# Patient Record
Sex: MALE | Race: WHITE | NOT HISPANIC OR LATINO
[De-identification: names, ages, dates, MRNs, and addresses within clinical notes are randomized per-mention and may not be internally consistent; named-entity substitution may affect disease eponyms.]

---

## 2017-04-09 ENCOUNTER — FORM ENCOUNTER (OUTPATIENT)
Age: 72
End: 2017-04-09

## 2021-09-24 PROBLEM — Z00.00 ENCOUNTER FOR PREVENTIVE HEALTH EXAMINATION: Status: ACTIVE | Noted: 2021-09-24

## 2021-09-27 ENCOUNTER — APPOINTMENT (OUTPATIENT)
Dept: GASTROENTEROLOGY | Facility: CLINIC | Age: 76
End: 2021-09-27
Payer: MEDICARE

## 2021-09-27 VITALS
BODY MASS INDEX: 32.18 KG/M2 | WEIGHT: 205 LBS | DIASTOLIC BLOOD PRESSURE: 80 MMHG | TEMPERATURE: 96.9 F | SYSTOLIC BLOOD PRESSURE: 142 MMHG | HEIGHT: 67 IN

## 2021-09-27 DIAGNOSIS — K57.90 DIVERTICULOSIS OF INTESTINE, PART UNSPECIFIED, W/OUT PERFORATION OR ABSCESS W/OUT BLEEDING: ICD-10-CM

## 2021-09-27 DIAGNOSIS — Z12.11 ENCOUNTER FOR SCREENING FOR MALIGNANT NEOPLASM OF COLON: ICD-10-CM

## 2021-09-27 DIAGNOSIS — K63.5 POLYP OF COLON: ICD-10-CM

## 2021-09-27 DIAGNOSIS — D37.4 NEOPLASM OF UNCERTAIN BEHAVIOR OF COLON: ICD-10-CM

## 2021-09-27 DIAGNOSIS — Z78.9 OTHER SPECIFIED HEALTH STATUS: ICD-10-CM

## 2021-09-27 PROCEDURE — 99204 OFFICE O/P NEW MOD 45 MIN: CPT

## 2021-09-27 PROCEDURE — 99214 OFFICE O/P EST MOD 30 MIN: CPT

## 2021-09-27 RX ORDER — METFORMIN HYDROCHLORIDE 1000 MG/1
1000 TABLET, COATED ORAL
Refills: 0 | Status: ACTIVE | COMMUNITY

## 2021-09-27 RX ORDER — SODIUM PICOSULFATE, MAGNESIUM OXIDE, AND ANHYDROUS CITRIC ACID 10; 3.5; 12 MG/160ML; G/160ML; G/160ML
10-3.5-12 MG-GM LIQUID ORAL TWICE DAILY
Qty: 2 | Refills: 0 | Status: ACTIVE | COMMUNITY
Start: 2021-09-27 | End: 1900-01-01

## 2021-09-27 NOTE — CONSULT LETTER
[Dear  ___] : Dear  [unfilled], [Consult Letter:] : I had the pleasure of evaluating your patient, [unfilled]. [( Thank you for referring [unfilled] for consultation for _____ )] : Thank you for referring [unfilled] for consultation for [unfilled] [Please see my note below.] : Please see my note below. [Consult Closing:] : Thank you very much for allowing me to participate in the care of this patient.  If you have any questions, please do not hesitate to contact me. [Sincerely,] : Sincerely, [FreeTextEntry3] : Don\par \par Sinan Perkins MD\par

## 2021-09-27 NOTE — HISTORY OF PRESENT ILLNESS
[FreeTextEntry1] : He is a 76-year-old asymptomatic male referred for screening colonoscopy.  He has a past history of colon polyps.  His last colonoscopy was 9/10/2018 in which 3 polyps were removed and diverticular disease was seen. 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  He denies a family history of colon cancer.

## 2021-09-27 NOTE — ASSESSMENT
[FreeTextEntry1] : MARGARETTE LEIGH was advised to undergo colonoscopy to which he agreed. The procedure will be performed in [facility] \par [facility] with the assistance of an anesthesiologist. The patient was given a Suprep preparation prescription and understood the \par procedure as it was explained to his. He was given a booklet distributed by the [employer]\par  Endoscopy explaining the procedure in detail and he understood the risks of the procedure not limited to infection, bleeding,\par perforation or non- diagnosis of colorectal cancer. He was advised that he could not drive home, if he chooses to\par  receive sedation.\par \par Further diagnostic and treatment recommendations will be based upon the procedure and any biopsies, if they are taken.\par \par Thank you for allowing me to participate in this [facility] health care.\par \par , Best personal regards -- Don\par

## 2021-09-27 NOTE — PHYSICAL EXAM
[Sclera] : the sclera and conjunctiva were normal [PERRL With Normal Accommodation] : pupils were equal in size, round, and reactive to light [Extraocular Movements] : extraocular movements were intact [Outer Ear] : the ears and nose were normal in appearance [Oropharynx] : the oropharynx was normal [Auscultation Breath Sounds / Voice Sounds] : lungs were clear to auscultation bilaterally [Heart Rate And Rhythm] : heart rate was normal and rhythm regular [Heart Sounds] : normal S1 and S2 [Heart Sounds Gallop] : no gallops [Murmurs] : no murmurs [Heart Sounds Pericardial Friction Rub] : no pericardial rub [Bowel Sounds] : normal bowel sounds [Abdomen Soft] : soft [Abdomen Tenderness] : non-tender [Abdomen Mass (___ Cm)] : no abdominal mass palpated [No CVA Tenderness] : no ~M costovertebral angle tenderness [No Spinal Tenderness] : no spinal tenderness [Abnormal Walk] : normal gait [Nail Clubbing] : no clubbing  or cyanosis of the fingernails [Musculoskeletal - Swelling] : no joint swelling seen [Motor Tone] : muscle strength and tone were normal [Skin Color & Pigmentation] : normal skin color and pigmentation [Skin Turgor] : normal skin turgor [] : no rash [Oriented To Time, Place, And Person] : oriented to person, place, and time [Impaired Insight] : insight and judgment were intact [Affect] : the affect was normal

## 2021-11-29 ENCOUNTER — APPOINTMENT (OUTPATIENT)
Dept: GASTROENTEROLOGY | Facility: AMBULATORY SURGERY CENTER | Age: 76
End: 2021-11-29
Payer: MEDICARE

## 2021-11-29 PROCEDURE — G0105: CPT

## 2022-07-27 ENCOUNTER — EMERGENCY (EMERGENCY)
Facility: HOSPITAL | Age: 77
LOS: 1 days | Discharge: HOME | End: 2022-07-27
Attending: EMERGENCY MEDICINE
Payer: MEDICARE

## 2022-07-27 VITALS
HEIGHT: 66 IN | OXYGEN SATURATION: 96 % | HEART RATE: 73 BPM | WEIGHT: 197.09 LBS | SYSTOLIC BLOOD PRESSURE: 239 MMHG | TEMPERATURE: 98 F | DIASTOLIC BLOOD PRESSURE: 99 MMHG | RESPIRATION RATE: 20 BRPM

## 2022-07-27 PROCEDURE — 99152 MOD SED SAME PHYS/QHP 5/>YRS: CPT

## 2022-07-27 PROCEDURE — 99283 EMERGENCY DEPT VISIT LOW MDM: CPT

## 2022-07-27 PROCEDURE — 99284 EMERGENCY DEPT VISIT MOD MDM: CPT | Mod: 57

## 2022-07-27 PROCEDURE — 23650 CLTX SHO DSLC W/MNPJ WO ANES: CPT | Mod: 54

## 2022-07-27 NOTE — ED ADULT NURSE NOTE - CAS DISCH CONDITION
Improved Closure 3 Information: This tab is for additional flaps and grafts above and beyond our usual structured repairs.  Please note if you enter information here it will not currently bill and you will need to add the billing information manually.

## 2022-07-27 NOTE — ED ADULT NURSE NOTE - OBJECTIVE STATEMENT
78yo M pt PMHx of DM p/w fall mechanical over a basket that was on the floor. pt endorses landing on his R side with R shoulder pain. pt has tried nothing for relief.   pt is A&Ox4, MAEW, RUE ROM limited  d/t pain, PMS intact.  Pt denies headache, dizziness, chest pain, palpitations, cough, SOB, abdominal pain, n/v/d, urinary symptoms, fevers, chills, weakness at this time.

## 2022-07-28 VITALS
SYSTOLIC BLOOD PRESSURE: 197 MMHG | HEART RATE: 80 BPM | RESPIRATION RATE: 20 BRPM | DIASTOLIC BLOOD PRESSURE: 79 MMHG | OXYGEN SATURATION: 95 %

## 2022-07-28 PROCEDURE — 71045 X-RAY EXAM CHEST 1 VIEW: CPT

## 2022-07-28 PROCEDURE — 73030 X-RAY EXAM OF SHOULDER: CPT | Mod: 26,RT

## 2022-07-28 PROCEDURE — 71045 X-RAY EXAM CHEST 1 VIEW: CPT | Mod: 26

## 2022-07-28 PROCEDURE — 99153 MOD SED SAME PHYS/QHP EA: CPT

## 2022-07-28 PROCEDURE — 73070 X-RAY EXAM OF ELBOW: CPT | Mod: 26,RT

## 2022-07-28 PROCEDURE — 73060 X-RAY EXAM OF HUMERUS: CPT | Mod: 26,RT

## 2022-07-28 PROCEDURE — 99152 MOD SED SAME PHYS/QHP 5/>YRS: CPT

## 2022-07-28 PROCEDURE — 73030 X-RAY EXAM OF SHOULDER: CPT

## 2022-07-28 PROCEDURE — 73060 X-RAY EXAM OF HUMERUS: CPT

## 2022-07-28 PROCEDURE — 96375 TX/PRO/DX INJ NEW DRUG ADDON: CPT | Mod: XU

## 2022-07-28 PROCEDURE — 96374 THER/PROPH/DIAG INJ IV PUSH: CPT | Mod: XU

## 2022-07-28 PROCEDURE — 99283 EMERGENCY DEPT VISIT LOW MDM: CPT

## 2022-07-28 PROCEDURE — 23650 CLTX SHO DSLC W/MNPJ WO ANES: CPT | Mod: RT

## 2022-07-28 PROCEDURE — 73070 X-RAY EXAM OF ELBOW: CPT

## 2022-07-28 PROCEDURE — 99285 EMERGENCY DEPT VISIT HI MDM: CPT | Mod: 25

## 2022-07-28 RX ORDER — OXYCODONE HYDROCHLORIDE 5 MG/1
5 TABLET ORAL ONCE
Refills: 0 | Status: DISCONTINUED | OUTPATIENT
Start: 2022-07-28 | End: 2022-07-28

## 2022-07-28 RX ORDER — MORPHINE SULFATE 50 MG/1
6 CAPSULE, EXTENDED RELEASE ORAL ONCE
Refills: 0 | Status: DISCONTINUED | OUTPATIENT
Start: 2022-07-28 | End: 2022-07-28

## 2022-07-28 RX ORDER — ACETAMINOPHEN 500 MG
1000 TABLET ORAL ONCE
Refills: 0 | Status: COMPLETED | OUTPATIENT
Start: 2022-07-28 | End: 2022-07-28

## 2022-07-28 RX ORDER — LIDOCAINE HCL 20 MG/ML
15 VIAL (ML) INJECTION ONCE
Refills: 0 | Status: COMPLETED | OUTPATIENT
Start: 2022-07-28 | End: 2022-07-28

## 2022-07-28 RX ORDER — PROPOFOL 10 MG/ML
50 INJECTION, EMULSION INTRAVENOUS ONCE
Refills: 0 | Status: COMPLETED | OUTPATIENT
Start: 2022-07-28 | End: 2022-07-28

## 2022-07-28 RX ORDER — PROPOFOL 10 MG/ML
135 INJECTION, EMULSION INTRAVENOUS ONCE
Refills: 0 | Status: COMPLETED | OUTPATIENT
Start: 2022-07-28 | End: 2022-07-28

## 2022-07-28 RX ORDER — DIAZEPAM 5 MG
5 TABLET ORAL ONCE
Refills: 0 | Status: DISCONTINUED | OUTPATIENT
Start: 2022-07-28 | End: 2022-07-28

## 2022-07-28 RX ADMIN — MORPHINE SULFATE 6 MILLIGRAM(S): 50 CAPSULE, EXTENDED RELEASE ORAL at 01:32

## 2022-07-28 RX ADMIN — OXYCODONE HYDROCHLORIDE 5 MILLIGRAM(S): 5 TABLET ORAL at 00:07

## 2022-07-28 RX ADMIN — Medication 400 MILLIGRAM(S): at 03:56

## 2022-07-28 RX ADMIN — Medication 1000 MILLIGRAM(S): at 05:52

## 2022-07-28 RX ADMIN — Medication 15 MILLILITER(S): at 01:54

## 2022-07-28 RX ADMIN — PROPOFOL 135 MILLIGRAM(S): 10 INJECTION, EMULSION INTRAVENOUS at 03:12

## 2022-07-28 RX ADMIN — PROPOFOL 50 MILLIGRAM(S): 10 INJECTION, EMULSION INTRAVENOUS at 03:25

## 2022-07-28 RX ADMIN — Medication 5 MILLIGRAM(S): at 00:39

## 2022-07-28 NOTE — ED PROCEDURE NOTE - NS_POSTPROCCAREGUIDE_ED_ALL_ED
Patient is now fully awake, with vital signs and temperature stable, hydration is adequate, patients Apollo’s  score is at baseline (or greater than 8), patient and escort has received  discharge education.

## 2022-07-28 NOTE — CONSULT NOTE ADULT - SUBJECTIVE AND OBJECTIVE BOX
77yMale  RHD presents with right shoulder pain s/p mechanical fall. Patient denies headstrike or LOC. Patient noted immediate pain after the injury and inability to range the shoulder. The patient subsequently came to the ED for further evaluation and management. In the ED, the patient endorses pain of the shoulder and inability to range the shoulder. Patient denies numbness, tingling, weakness, or any other orthopaedic complaint.     Gen: Resting in bed, NAD  RUE:  Skin intact, + sulcus sign,   SILT radial/median/ulnar/axillary  +AIN/PIN/Ulnar/Radial/Musc/Median,   +elbow/wrist ROM,   shoulder ROM limited 2/2 pain,   +radial pulse, soft compartments.    Secondary Assessment:  NC/AT, NTTP of clavicles, NTTP of C-spine,T-spine, or L-spine in the midline and paraspinal areas; NTTP of pelvis  LUE: NTTP of Shoulder, Elbow, Wrist, Hand; NT with AROM/PROM of Shoulder, Elbow, Wrist, Hand; AIN/PIN/Med/Uln/Msc/Rad/Ax intact  LLE: Able to SLR, NT with Log Roll, NT with Heel Strike, NTTP of Hip, Knee, Ankle, Foot; NT with AROM/PROM of Hip, Knee, Ankle, Foot; Q/H/GSC/TA/EHL/FHL intact  RLE: Able to SLR, NT with Log Roll, NT with Heel Strike, NTTP of Hip, Knee, Ankle, Foot; NT with AROM/PROM of Hip, Knee, Ankle, Foot; Q/H/GSC/TA/EHL/FHL intact    Imaging: Xray imaging of the R shoulder reviewed demonstrating shoulder dislocation.     Procedure Note:  Patient underwent conscious sedation per ED. Closed reduction was then performed and the affected extremity was immobilized. The patient tolerated the procedure well and there were no complications. The patient was neurovascularly intact following reduction. Post-reduction xrays demonstrated a reduced shoulder.    Assessment:  77yMale with R shoulder dislocation    Plan:   Imaging findings reviewed and discussed with the patient.   Shoulder reduced per procedure note above.   NWB R UE in shoulder immobilizer  Pain control PRN  No acute orthopaedic surgical intervention indicated at this time. This patient is orthopaedically stable for discharge.   Patient to follow up with Dr. Diez as an outpatient for further evaluation and management.   All of the patient's questions and concerns were answered and addressed.  Will discuss with Dr. Diez and advise of any changes to the plan.

## 2022-07-28 NOTE — ED PROVIDER NOTE - CARE PROVIDER_API CALL
Sean Diez (MD)  Orthopaedic Surgery  [address]  [place] [address]  Phone: [phone]  Fax: [phone]  Follow Up Time:

## 2022-07-28 NOTE — ED PROVIDER NOTE - PATIENT PORTAL LINK FT
You can access the FollowMyHealth Patient Portal offered by [facility] by registering at the following website: [email or web address]. By joining [employer]’s FollowMyHealth portal, you will also be able to view your health information using other applications (apps) compatible with our system.

## 2022-07-28 NOTE — ED PROVIDER NOTE - NSFOLLOWUPINSTRUCTIONS_ED_ALL_ED_FT
Please follow with Dr. Sean Diez within the next 7 days.     To control your pain at home, you should take Ibuprofen 400 mg along with Tylenol 650mg-1000mg every 6 to 8 hours. Limit your maximum daily Tylenol from all sources to 4000mg.     Lab and imaging results, if performed, were discussed with you along with your discharge diagnosis    Follow up with your doctor in 1 week - bring copies of your results if you were given. If you do not have a primary doctor, please call [phone] to find one convenient for you    Return to ED for any new or worsening symptoms including but not limited to: development of worsening arm pain, dislocation, changes in temperature of arm, chest pain, shortness of breath, fever, vomiting, focal numbness, weakness or tingling, any severe CP, headache, abdominal pain, back pain.      Continue all prescribed medications    Rest and keep yourself hydrated with fluids

## 2022-07-28 NOTE — ED PROVIDER NOTE - WR ORDER DATE AND TIME 2
New home O@ requirement since D/C on 10/1/18  Patient reports that she only uses when she feels like she needs it - while climbing stairs    Continual SPO2 monitoring  Respiratory protocol  Continue diureisis 28-Jul-2022 03:49

## 2022-07-28 NOTE — ED ADULT NURSE REASSESSMENT NOTE - NS ED NURSE REASSESS COMMENT FT1
conscious sedation done for shoulder reduction please see flow sheet for details.. . all equipment in room including capnography, ambu bag, pt on cardiac monitor. pt tolerated procedure well, now fully awake. extra propofol wasted by 2 ED RNs

## 2022-07-28 NOTE — ED PROCEDURE NOTE - PROCEDURE ADDITIONAL DETAILS
In ED, multiple techniques performed for R shoulder dislocation. Given oxycodone, valium, morphine 6mg and intraarticular block with lidocaine. These attempts were not successful. Ortho consulted. Under conscious sedation, reduction reattempted. Patient was placed supine. Supplemental 02 given, placed on monitor. Vitals continuously monitored during procedure. Total of 185mg Propofol given. Shoulder reduced via Kocher. Post-procedure XR showed successful reduction.

## 2022-07-28 NOTE — ED PROVIDER NOTE - PHYSICAL EXAMINATION
General: Alert and Orientated x 3. In mod pain   Head: Normocephalic and atraumatic.  Eyes: PERRLA with EOMI.  Neck: Supple. Trachea midline.   Cardiac: Normal S1 and S2 w/ RRR. No murmurs appreciated.   Pulmonary: Vesicular breath sounds bilaterally. No increased WOB. No wheezes or crackles.  Abdominal: Soft, non-tender. (+) bowel sounds appreciated in all 4 quadrants. No hepatosplenomegaly.   Neurologic: No focal sensory or motor deficits.  Musculoskeletal: RUE: Obvious bony deformity, asymmetry when compared to L shoulder. Sensationm intact in axillary distribution. Arm in adduction and internal rotation. Limited rom. strength 3/5 due to pain. Pulse intact.   Skin: Color appropriate for race. Intact, warm, and well-perfused.  Lymphatic: No cervical or inguinal adenopathy appreciated.  Psychiatric: Appropriate mood and affect. No apparent risk to self or others.

## 2022-07-28 NOTE — ED PROVIDER NOTE - CLINICAL SUMMARY MEDICAL DECISION MAKING FREE TEXT BOX
78 y/o m p/w R shoulder pain s/p mechanical fall on outstretched arm. Hypertensive. Exam w/ shoulder asymmetry. Neurovascularly intact. Likely shoulder dislocation. Will get xrs. Will reduce. Dispo- pending imaging an reduction.

## 2022-07-28 NOTE — ED PROVIDER NOTE - OBJECTIVE STATEMENT
78 y/o M R-handed w/ PMHx sig for DM and hypothyroidism who presents to ED with R shoulder pain. Around 10pm, patient was walking and trippe avinash baset. Fell on outstretched arm. Never injured arm. No numbness or tingling. No head injury. Has not taken anything for pain.

## 2022-07-28 NOTE — ED PROVIDER NOTE - PROGRESS NOTE DETAILS
Jenelle Curry MD (PGY2): Ortho consulted for unsuccessful reduction attempts. Under procedural sedation, shoulder reduced. Consent obtained. Procedural sedation w/ propofol started at 3:12am. Vitals continuously monitored. Was placed on supplemental O2. Post-reduction films showing success. Patient observed for 30 minutes. Vitals stable. Post- reduction neurovascularly intact. ortho f/u outpatient. strict return precautions.

## 2022-07-28 NOTE — ED PROCEDURE NOTE - ATTENDING CONTRIBUTION TO CARE
***Seth Fatima MD FACEP*** Attending physician was available for the key components of the procedure, the patient tolerated well. There were no complications with the procedure.

## 2022-07-28 NOTE — ED PROVIDER NOTE - ATTENDING CONTRIBUTION TO CARE
Patient with fall prior to arrival after tripping on a basket. patient did not hit his head, did not lose consciousness but fell on an outstretched hand and had resultant right shoulder pain with inability to range. No history of prior shoulder injury.  no shortness of breath.   right shoulder with sulcus to glenohumeral fossae and humeral head palpated anterior to the fossae  trachea midline  mmm  Mallampati 2  bilateral breath sounds   no deformity of lower extremities  no rash/vesicles/petechiae   soft, ntnd   pleasant, cooperative, with capacity and insight   Seth Fatima MD, FACEP: In this physician's medical judgement based on clinical history and physical exam: patient with right shoulder anterior dislocation vs humeral neck fracture will obtain shoulder xray, analgesia, and attempt reduction with appropriate analgesia.  patient unable to be reduced with Keita, Milagros, Farodilon, and Milstacy techniques patient had valium, oxycodone and morphine administered  will consult orthopedics for further attempts   last meal 1830 Patient with fall prior to arrival after tripping on a basket. patient did not hit his head, did not lose consciousness but fell on an outstretched hand and had resultant right shoulder pain with inability to range. No history of prior shoulder injury.  no shortness of breath.   right shoulder with sulcus to glenohumeral fossae and humeral head palpated anterior to the fossae  trachea midline  mmm  Mallampati 2  bilateral breath sounds   no deformity of lower extremities  no rash/vesicles/petechiae   soft, ntnd   pleasant, cooperative, with capacity and insight   Seth Fatima MD, FACEP: In this physician's medical judgement based on clinical history and physical exam: patient with right shoulder anterior dislocation vs humeral neck fracture will obtain shoulder xray, analgesia, and attempt reduction with appropriate analgesia.  patient unable to be reduced with Keita, Milagros, Jorge, and Herson techniques patient had valium, oxycodone and morphine administered  will consult orthopedics for further attempts   last meal 1830  patient then reduced with orthopedics at bedside with conscious sedation, patient neurovascularly intact and reduced and blood pressure noted to be elevated but likely secondary to pain.   will discharge patient home with orthopedic follow up   The patient was serially evaluated throughout emergency department course by the team. There was no acute deterioration up to this time in the emergency department. The patient has demonstrated clinical improvement and/or stability, feels better at this time according to emergency department team. Agree with goals/plan of emergency department care as described in this physician's electronic medical record, including diagnostics, therapeutics and consultation recommendation as clinically warranted. Will discharge home with close outpatient follow up with primary care physician/provider and specialist if necessary. The patient and/or family was educated on expectant management and return precautions concerning signs and features to return to the emergency department, in layman terms, including but not limited to: nausea, vomiting, fever, chills, the inability to eat, take medications, or drink, persistent/worsening symptoms or any concerns at all. There are no acute or immediate life threatening issues present on history, clinical exam, or any diagnostic evaluation. The patient is a safe disposition home, has capacity and insight into their condition, is ambulatory in the Emergency Department with no further questions and will follow up with their doctor(s) this week. Diagnosis, prognosis, natural history and treatment was discussed with patient and/or family. The patient and/or family were given the opportunity to ask questions and have them answered in full. The patient and/or family are with capacity and insight into the situation, treatment, risks, benefits, alternative therapies, and understand that they can ask any further questions if needed. Patient and/or family/guardian understands anticipatory guidance and was given strict return and follow up precautions. The patient and/or family/guardian has been informed of the necessity to follow up with the PMD/Clinic/follow up as provided within 2-3 days, and the patient and/or family/guardian reports understanding of above with capacity and insight. The patient and/or family/guardian were informed of any results of their tests and are were encouraged to follow up on the findings with their doctor as well as the need to inform their doctor of any results. The patient and/or family/guardian are aware of the need to follow up with repeat testing as applicable and report understanding of the above with capacity and insight. The patient and/or family/guardian was made aware of any pending test results at the time of discharge and of the need to call back for the final results as well as the need to inform their doctor of the results.

## 2022-07-28 NOTE — ED PROCEDURE NOTE - CPROC ED REDUCTION TECHNIQUE1
Multiple: akira Keita FARES, Kocher/scapular manipulation Multiple: Milagros Keita FARES, Kocher/scapular manipulation

## 2022-08-01 PROBLEM — E03.9 HYPOTHYROIDISM, UNSPECIFIED: Chronic | Status: ACTIVE | Noted: 2022-07-28

## 2022-08-01 PROBLEM — E11.9 TYPE 2 DIABETES MELLITUS WITHOUT COMPLICATIONS: Chronic | Status: ACTIVE | Noted: 2022-07-28

## 2022-08-08 ENCOUNTER — APPOINTMENT (OUTPATIENT)
Dept: ORTHOPEDIC SURGERY | Facility: CLINIC | Age: 77
End: 2022-08-08

## 2022-08-08 VITALS
HEIGHT: 67 IN | WEIGHT: 195 LBS | DIASTOLIC BLOOD PRESSURE: 82 MMHG | SYSTOLIC BLOOD PRESSURE: 190 MMHG | HEART RATE: 72 BPM | BODY MASS INDEX: 30.61 KG/M2

## 2022-08-08 PROCEDURE — 73030 X-RAY EXAM OF SHOULDER: CPT | Mod: RT

## 2022-08-08 PROCEDURE — 99204 OFFICE O/P NEW MOD 45 MIN: CPT

## 2022-08-08 NOTE — HISTORY OF PRESENT ILLNESS
[de-identified] : 77 year old RHD male presents for initial evaluation of right shoulder pain. He states about two weeks ago he tripped and fell in his room landing on his shoulder. He heard a pop, immediately experienced sharp pain. He went to [facility] ER same day, where he had x-rays taken which reportedly showed anterior dislocation. The shoulder was reduced and x-rays were taken post reduction to confirm. He has intermittent achy pain, slightly worse with movements. He has been taking Tylenol with good relief. He denies any prior injury or trauma. He denies numbness or tingling.

## 2022-08-08 NOTE — PHYSICAL EXAM
[Rad] : radial 2+ and symmetric bilaterally [Normal] : Alert and in no acute distress [Poor Appearance] : well-appearing [Acute Distress] : not in acute distress [Obese] : not obese [de-identified] : The patient has no respiratory distress. Mood and affect are normal. The patient is alert and oriented to person, place and time.\par Examination of the cervical spine demonstrates no tenderness, no deformity and no muscle spasm. Cervical spine rotation is 60° to the right, 60° to the left, 75° of extension and 45° of flexion. Neurologic exam of the upper extremities reveals intact sensation to light touch. Motor function is 5 over 5 in all groups. Deep tendon reflexes are 2+ and equal at the biceps, triceps and brachioradialis.\par Examination of the right shoulder demonstrates that it is clinically located.  There is mild anterolateral tenderness.  There is a small area of ecchymosis on the right arm.  Drop arm test is negative.  He has weakness with external rotation against resistance.  He has a Enmanuel deformity.  The elbows are stable.  There is no pain with wrist range of motion.  The skin is otherwise intact.  There is no lymphedema. [de-identified] : AP, transscapular and axillary x-rays of the right shoulder taken today demonstrate no fracture or dislocation.  Patient has Hill-Sachs lesion of the proximal humerus\par X-rays from the emergency room at [facility] are reviewed.  This includes x-rays of the right shoulder, x-ray of the right humerus, and postreduction x-rays of the right shoulder.  These demonstrate anterior dislocation with Hill-Sachs lesion with concentric reduction.

## 2022-08-08 NOTE — DISCUSSION/SUMMARY
[de-identified] : The patient had a dislocation of his right shoulder.  He likely has a rotator cuff tear.  I have discussed the pathology and natural history with him.  I recommend a course of physical therapy.  A referral was given.  He will be reevaluated in 1 month.  He should avoid external rotation beyond 40 degrees.

## 2022-09-12 ENCOUNTER — APPOINTMENT (OUTPATIENT)
Dept: ORTHOPEDIC SURGERY | Facility: CLINIC | Age: 77
End: 2022-09-12

## 2022-09-12 VITALS
WEIGHT: 195 LBS | SYSTOLIC BLOOD PRESSURE: 215 MMHG | HEIGHT: 67 IN | BODY MASS INDEX: 30.61 KG/M2 | HEART RATE: 66 BPM | DIASTOLIC BLOOD PRESSURE: 74 MMHG

## 2022-09-12 PROCEDURE — 99213 OFFICE O/P EST LOW 20 MIN: CPT

## 2022-09-12 RX ORDER — MELOXICAM 7.5 MG/1
7.5 TABLET ORAL DAILY
Qty: 1 | Refills: 0 | Status: ACTIVE | COMMUNITY
Start: 2022-09-12 | End: 1900-01-01

## 2022-09-12 NOTE — PHYSICAL EXAM
[Rad] : radial 2+ and symmetric bilaterally [Normal] : Alert and in no acute distress [Poor Appearance] : well-appearing [Acute Distress] : not in acute distress [Obese] : not obese [de-identified] : The patient has no respiratory distress. Mood and affect are normal. The patient is alert and oriented to person, place and time.\par Examination of the cervical spine demonstrates no tenderness, no deformity and no muscle spasm. Cervical spine rotation is 60° to the right, 60° to the left, 75° of extension and 45° of flexion. Neurologic exam of the upper extremities reveals intact sensation to light touch. Motor function is 5 over 5 in all groups. Deep tendon reflexes are 2+ and equal at the biceps, triceps and brachioradialis.\par Examination of the right shoulder demonstrates that it is clinically located.  There is mild anterolateral tenderness.  There is a small area of ecchymosis on the right arm.  Drop arm test is negative.  He has weakness with external rotation against resistance.  He has a Enmanuel deformity.  The elbows are stable.  There is no pain with wrist range of motion.  The skin is otherwise intact.  There is no lymphedema.

## 2022-09-12 NOTE — DISCUSSION/SUMMARY
[de-identified] : The patient has persistent right shoulder pain following dislocation.  He does have elevation of 150 degrees with pain.  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  He has been taking Tylenol without significant pain relief.  He feels that the physical therapy has not been helping.  I have started him on a course of Mobic.  Medication risks have been reviewed.  He will discontinue physical therapy and work on a home exercise program.  I would like to reevaluate him in 2 weeks.

## 2022-09-12 NOTE — HISTORY OF PRESENT ILLNESS
[de-identified] : 77 year old male presents for reevaluation of right shoulder pain s/p dislocation about 1 month ago. He has been doing PT 2 x per week, last session was last week. He has not been taking any medications for the pain, has been using a topical pain reliever with good relief.

## 2022-10-03 ENCOUNTER — APPOINTMENT (OUTPATIENT)
Dept: ORTHOPEDIC SURGERY | Facility: CLINIC | Age: 77
End: 2022-10-03

## 2022-10-03 VITALS
WEIGHT: 195 LBS | SYSTOLIC BLOOD PRESSURE: 197 MMHG | HEIGHT: 67 IN | HEART RATE: 58 BPM | DIASTOLIC BLOOD PRESSURE: 71 MMHG | BODY MASS INDEX: 30.61 KG/M2

## 2022-10-03 DIAGNOSIS — S49.91XA UNSPECIFIED INJURY OF RIGHT SHOULDER AND UPPER ARM, INITIAL ENCOUNTER: ICD-10-CM

## 2022-10-03 PROCEDURE — 99214 OFFICE O/P EST MOD 30 MIN: CPT

## 2022-10-03 RX ORDER — NABUMETONE 750 MG/1
750 TABLET, FILM COATED ORAL
Qty: 60 | Refills: 0 | Status: ACTIVE | COMMUNITY
Start: 2022-10-03 | End: 1900-01-01

## 2022-10-03 NOTE — PHYSICAL EXAM
[Rad] : radial 2+ and symmetric bilaterally [Normal] : Alert and in no acute distress [Poor Appearance] : well-appearing [Acute Distress] : not in acute distress [Obese] : not obese [de-identified] : The patient has no respiratory distress. Mood and affect are normal. The patient is alert and oriented to person, place and time.\par Examination of the cervical spine demonstrates no tenderness, no deformity and no muscle spasm. Cervical spine rotation is 60° to the right, 60° to the left, 75° of extension and 45° of flexion. Neurologic exam of the upper extremities reveals intact sensation to light touch. Motor function is 5 over 5 in all groups. Deep tendon reflexes are 2+ and equal at the biceps, triceps and brachioradialis.\par Examination of the right shoulder demonstrates that it is clinically located.  There is mild anterolateral tenderness.   Drop arm test is negative.  He has weakness with external rotation against resistance.  He has a Enmanuel deformity.  The elbows are stable.  There is no pain with wrist range of motion.  The skin is otherwise intact.  There is no lymphedema.

## 2022-10-03 NOTE — DISCUSSION/SUMMARY
[Medication Risks Reviewed] : Medication risks reviewed [de-identified] : The patient has continued right shoulder pain.  He did not get relief with physical therapy or Mobic.  I have offered him a steroid injection but I have explained that his blood sugar may go up temporarily.  He has decided not to have the injection.  He is switched to a course of nabumetone.  He will be reevaluated in 4 weeks.

## 2022-10-03 NOTE — HISTORY OF PRESENT ILLNESS
[de-identified] : The patient presents for reevaluation of right shoulder pain s/p dislocation about 1 1/2 months ago. He feels no improvement in symptoms from his last visit. He has been taking Mobic with minimal relief. He tried a course of PT with no relief. 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 He is interested in discussing other options for pain relief.

## 2022-10-31 ENCOUNTER — APPOINTMENT (OUTPATIENT)
Dept: ORTHOPEDIC SURGERY | Facility: CLINIC | Age: 77
End: 2022-10-31

## 2022-11-09 ENCOUNTER — RX RENEWAL (OUTPATIENT)
Age: 77
End: 2022-11-09

## 2022-12-08 DIAGNOSIS — Z80.9 FAMILY HISTORY OF MALIGNANT NEOPLASM, UNSPECIFIED: ICD-10-CM

## 2022-12-08 DIAGNOSIS — Z83.3 FAMILY HISTORY OF DIABETES MELLITUS: ICD-10-CM

## 2022-12-08 DIAGNOSIS — L60.2 ONYCHOGRYPHOSIS: ICD-10-CM

## 2022-12-08 DIAGNOSIS — Q82.8 OTHER SPECIFIED CONGENITAL MALFORMATIONS OF SKIN: ICD-10-CM

## 2022-12-08 DIAGNOSIS — Z87.39 PERSONAL HISTORY OF OTHER DISEASES OF THE MUSCULOSKELETAL SYSTEM AND CONNECTIVE TISSUE: ICD-10-CM

## 2022-12-21 ENCOUNTER — APPOINTMENT (OUTPATIENT)
Dept: PODIATRY | Facility: CLINIC | Age: 77
End: 2022-12-21
Payer: MEDICARE

## 2022-12-21 DIAGNOSIS — M21.612 BUNION OF LEFT FOOT: ICD-10-CM

## 2022-12-21 DIAGNOSIS — I10 ESSENTIAL (PRIMARY) HYPERTENSION: ICD-10-CM

## 2022-12-21 DIAGNOSIS — M10.479 OTHER SECONDARY GOUT, UNSPECIFIED ANKLE AND FOOT: ICD-10-CM

## 2022-12-21 DIAGNOSIS — M10.472 OTHER SECONDARY GOUT, LEFT ANKLE AND FOOT: ICD-10-CM

## 2022-12-21 DIAGNOSIS — Q66.219 UNSP FOOT CONGEN METATARSUS PRIMUS VARUS: ICD-10-CM

## 2022-12-21 DIAGNOSIS — M21.619 BUNION OF UNSPECIFIED FOOT: ICD-10-CM

## 2022-12-21 DIAGNOSIS — E11.9 TYPE 2 DIABETES MELLITUS W/OUT COMPLICATIONS: ICD-10-CM

## 2022-12-21 PROCEDURE — 73630 X-RAY EXAM OF FOOT: CPT | Mod: LT

## 2022-12-21 PROCEDURE — 99203 OFFICE O/P NEW LOW 30 MIN: CPT

## 2022-12-22 NOTE — PHYSICAL EXAM
[2+] : left foot dorsalis pedis 2+ [Oriented To Time, Place, And Person] : oriented to person, place, and time [Impaired Insight] : insight and judgment were intact [General Appearance - Alert] : alert [General Appearance - Well-Appearing] : healthy appearing [Skin Color & Pigmentation] : normal skin color and pigmentation [Skin Turgor] : normal skin turgor [] : no rash [Skin Lesions] : no skin lesions [Sensation] : the sensory exam was normal to light touch and pinprick [No Focal Deficits] : no focal deficits [Deep Tendon Reflexes (DTR)] : deep tendon reflexes were 2+ and symmetric [Motor Exam] : the motor exam was normal [Delayed in the Right Toes] : capillary refills normal in right toes [Delayed in the Left Toes] : capillary refills normal in the left toes [FreeTextEntry3] : Temperature gradient: warm to cool. [de-identified] : Some mild pain, dorsolateral of the left foot.  Minimal pain at the 1st MPJ.  Forefoot varus, fully compensated.  HAV with bunion, left foot.   [Foot Ulcer] : no foot ulcer [Skin Induration] : no skin induration [FreeTextEntry1] : Minimal pain and swelling of the right midfoot.  Mild bursitis medial aspect of the 1st metatarsal.

## 2022-12-22 NOTE — HISTORY OF PRESENT ILLNESS
[Post-op Sandals] : post-op sandals [FreeTextEntry1] : Patient presents today for evaluation of left foot pain.  One month ago, he was seen in [place] and was told that he had a navicular fracture and he is walking in a surgical shoe.  He states that he does not recall any injury.  He woke up; had some pain in the 1st MPJ, left foot.  He had some mild pain at the dorsolateral aspect of the foot.  He was told he had a navicular fracture based on the previous records that were reviewed.  \par

## 2022-12-22 NOTE — ASSESSMENT
[FreeTextEntry1] : Impression: Based on the patient's history, I feel that he may have had an acute gout attack of the 1st MPJ.  There is no evidence of any fracture.  \par He was advised to get out of the surgical shoe.  Get into a sneaker.  Increase his activities and ROM.  \par Did not feel any further intervention is warranted.  \par Any questions or problems, patient is to contact the office.

## 2022-12-22 NOTE — PROCEDURE
[FreeTextEntry1] : X-rays were taken to rule out any fracture or fracture healing, as suggested by the [place] physician.  \par (DP, medial oblique, lateral - left foot) There is no evidence of a fracture.  There is an HAV with bunion.  Increased intermetatarsal angle.

## 2025-05-27 ENCOUNTER — NON-APPOINTMENT (OUTPATIENT)
Age: 80
End: 2025-05-27